# Patient Record
Sex: FEMALE | NOT HISPANIC OR LATINO | URBAN - METROPOLITAN AREA
[De-identification: names, ages, dates, MRNs, and addresses within clinical notes are randomized per-mention and may not be internally consistent; named-entity substitution may affect disease eponyms.]

---

## 2022-06-01 ENCOUNTER — OFFICE VISIT (OUTPATIENT)
Dept: ENDOCRINOLOGY | Facility: CLINIC | Age: 72
End: 2022-06-01

## 2022-06-01 VITALS
DIASTOLIC BLOOD PRESSURE: 75 MMHG | OXYGEN SATURATION: 98 % | HEART RATE: 71 BPM | HEIGHT: 61 IN | SYSTOLIC BLOOD PRESSURE: 116 MMHG | WEIGHT: 151 LBS | BODY MASS INDEX: 28.51 KG/M2

## 2022-06-01 DIAGNOSIS — G47.00 INSOMNIA, UNSPECIFIED TYPE: ICD-10-CM

## 2022-06-01 DIAGNOSIS — E66.3 OVERWEIGHT (BMI 25.0-29.9): ICD-10-CM

## 2022-06-01 DIAGNOSIS — R94.6 ABNORMAL THYROID FUNCTION TEST: Primary | ICD-10-CM

## 2022-06-01 PROBLEM — E78.2 MIXED HYPERLIPIDEMIA: Status: ACTIVE | Noted: 2022-06-01

## 2022-06-01 PROBLEM — F41.9 ANXIETY: Status: ACTIVE | Noted: 2022-06-01

## 2022-06-01 PROBLEM — J30.2 SEASONAL ALLERGIES: Status: ACTIVE | Noted: 2022-06-01

## 2022-06-01 PROBLEM — Z86.718 HISTORY OF DVT (DEEP VEIN THROMBOSIS): Status: ACTIVE | Noted: 2022-06-01

## 2022-06-01 PROCEDURE — 99204 OFFICE O/P NEW MOD 45 MIN: CPT | Performed by: INTERNAL MEDICINE

## 2022-06-01 RX ORDER — MULTIVITAMIN WITH IRON
100 TABLET ORAL DAILY
COMMUNITY

## 2022-06-01 RX ORDER — SERTRALINE HYDROCHLORIDE 100 MG/1
100 TABLET, FILM COATED ORAL DAILY
COMMUNITY

## 2022-06-01 RX ORDER — LEVOCETIRIZINE DIHYDROCHLORIDE 5 MG/1
5 TABLET, FILM COATED ORAL EVERY EVENING
COMMUNITY

## 2022-06-01 RX ORDER — MULTIPLE VITAMINS W/ MINERALS TAB 9MG-400MCG
1 TAB ORAL DAILY
COMMUNITY

## 2022-06-01 RX ORDER — ROSUVASTATIN CALCIUM 10 MG/1
TABLET, COATED ORAL
COMMUNITY
Start: 2022-02-23

## 2022-06-01 RX ORDER — OMEPRAZOLE 40 MG/1
40 CAPSULE, DELAYED RELEASE ORAL DAILY
COMMUNITY

## 2022-06-01 RX ORDER — MONTELUKAST SODIUM 10 MG/1
10 TABLET ORAL NIGHTLY
COMMUNITY

## 2022-06-01 RX ORDER — HYDROXYZINE HYDROCHLORIDE 25 MG/1
25 TABLET, FILM COATED ORAL 3 TIMES DAILY PRN
COMMUNITY

## 2022-06-01 NOTE — PROGRESS NOTES
Chief Complaint   Patient presents with   • Thyroid Problem        Referring Provider  Marilu Henao, *     HPI   Sanaz Jensen is a 71 y.o. female had concerns including Thyroid Problem.     Was on levothyroxine for about 9 years in the past.   She was taken off of this medication as the TSH was suppressed. Recheck level was after 6 months of the medication showed persistent suppressed TSH.  Doses of levothyroxine in the past - 25 mcg.     Is on biotin - has been taking for about a year or so.   Complains of fatigue.  Sleep is poor - has trouble falling to sleep. Worse in recent months. Didn't fall asleep until 6 AM today. Slept maybe 3 hrs.   Has gained about 25 lbs. Doesn't eat healthy per her report. Too much soda, ice cream, snacking.     Has hot flashes then followed by cold spell. Started about 6 months ago.     Of note, pt underwent complete hysterectomy with oophorectomy age 21. Was on premarin until recently - had a DVT and was taken off the premarin. Has felt poorly since.     Past Medical History:   Diagnosis Date   • Anxiety    • GERD (gastroesophageal reflux disease)    • Hyperlipidemia      Past Surgical History:   Procedure Laterality Date   • FOOT SURGERY     • HYSTERECTOMY        Family History   Problem Relation Age of Onset   • Thyroid disease Mother         goiter   • Diabetes Mother    • Myelodysplastic syndrome Father    • Diabetes Brother    • Cancer Brother    • Cancer Brother       Social History     Socioeconomic History   • Marital status:    Tobacco Use   • Smoking status: Never Smoker   • Smokeless tobacco: Never Used   Substance and Sexual Activity   • Alcohol use: Never   • Drug use: Never   • Sexual activity: Never      No Known Allergies   Current Outpatient Medications on File Prior to Visit   Medication Sig Dispense Refill   • calcium citrate-vitamin d (CALCITRATE) 315-250 MG-UNIT tablet tablet Take  by mouth Daily.     • hydrOXYzine (ATARAX) 25 MG tablet Take 25 mg  "by mouth 3 (Three) Times a Day As Needed for Itching.     • levocetirizine (XYZAL) 5 MG tablet Take 5 mg by mouth Every Evening.     • montelukast (SINGULAIR) 10 MG tablet Take 10 mg by mouth Every Night.     • multivitamin with minerals tablet tablet Take 1 tablet by mouth Daily.     • omeprazole (priLOSEC) 40 MG capsule Take 40 mg by mouth Daily.     • rosuvastatin (CRESTOR) 10 MG tablet TAKE ONE TABLET BY MOUTH EVERY DAY **REPLACING PRAVASTATIN**     • sertraline (ZOLOFT) 100 MG tablet Take 100 mg by mouth Daily.     • vitamin B-6 (PYRIDOXINE) 100 MG tablet Take 100 mg by mouth Daily.       No current facility-administered medications on file prior to visit.        Review of Systems   Constitutional: Positive for chills and diaphoresis.   HENT: Positive for congestion, dental problem, rhinorrhea, sinus pressure, sneezing and sore throat.    Eyes: Negative.    Respiratory: Positive for cough.    Cardiovascular: Positive for leg swelling.   Endocrine:        See HPI   Genitourinary: Negative.    Musculoskeletal: Positive for myalgias.   Skin: Negative.    Allergic/Immunologic: Negative.    Neurological: Negative.    Hematological: Negative.    Psychiatric/Behavioral: Positive for sleep disturbance.        /75   Pulse 71   Ht 154.9 cm (61\")   Wt 68.5 kg (151 lb)   SpO2 98%   BMI 28.53 kg/m²      Physical Exam    Constitutional:  well developed; well nourished  no acute distress   ENT/Thyroid: no thyromegaly  no palpable nodules   Eyes: EOM intact  Conjunctiva: clear   Respiratory:  breathing is unlabored  clear to auscultation bilaterally   Cardiovascular:  regular rate and rhythm, S1, S2 normal, no murmur, click, rub or gallop   Chest:  Not performed.   Abdomen: Not performed.   : Not performed.   Musculoskeletal: negative findings:  ROM of all joints is normal, no deformities present   Skin: dry and warm   Neuro: normal without focal findings and mental status, speech normal, alert and oriented x3 "   Psych: oriented to time, place and person, mood and affect are within normal limits     Labs/Imaging  2/18/2022 TSH 0.04, free T4 0.82, CMP with creatinine 0.8, GFR greater than 60, LFTs normal, total cholesterol 164, triglycerides 151, HDL 46, LDL 88    Assessment and Plan    Diagnoses and all orders for this visit:    1. Abnormal thyroid function test (Primary)  History of hypothyroidism and low dose levothyroxine was discontinued over 6 months ago due to suppressed TSH. Was still suppressed off the medication. Is on biotin. Clinically the pt is euthyroid.   Discontinue biotin and recheck TFTs including thyroid antibodies.  Pending labs, additional evaluation may be necessary.   Thyroid is normal on exam.  -     T4, Free; Future  -     TSH; Future  -     Thyrotropin Receptor Antibody; Future  -     Thyroid Stimulating Immunoglobulin; Future  -     Thyroid Peroxidase Antibody; Future    2. Insomnia, unspecified type  Contributing most significantly to her fatigue.  Follow-up with PCP for management of insomnia.    3. Overweight (BMI 25.0-29.9)  BMI 28.5 with a 25 pound weight gain.  Due to diet soda, sweets, snacking.  Inadequate sleep will also contribute to weight management issues.       Return pending lab results. The patient was instructed to contact the clinic with any interval questions or concerns.    Ct Fernandes, DO   Endocrinologist    Please note that portions of this note were completed with a voice recognition program.

## 2022-08-22 ENCOUNTER — TELEPHONE (OUTPATIENT)
Dept: ENDOCRINOLOGY | Facility: CLINIC | Age: 72
End: 2022-08-22